# Patient Record
Sex: MALE | Race: WHITE | Employment: OTHER | URBAN - METROPOLITAN AREA
[De-identification: names, ages, dates, MRNs, and addresses within clinical notes are randomized per-mention and may not be internally consistent; named-entity substitution may affect disease eponyms.]

---

## 2022-12-14 ENCOUNTER — HOSPITAL ENCOUNTER (EMERGENCY)
Dept: ULTRASOUND IMAGING | Age: 37
Discharge: HOME | End: 2022-12-17

## 2022-12-14 ENCOUNTER — HOSPITAL ENCOUNTER (EMERGENCY)
Age: 37
Discharge: HOME | End: 2022-12-14
Attending: EMERGENCY MEDICINE

## 2022-12-14 VITALS
BODY MASS INDEX: 23.86 KG/M2 | OXYGEN SATURATION: 99 % | DIASTOLIC BLOOD PRESSURE: 74 MMHG | WEIGHT: 180 LBS | TEMPERATURE: 98.3 F | SYSTOLIC BLOOD PRESSURE: 124 MMHG | HEART RATE: 91 BPM | RESPIRATION RATE: 19 BRPM | HEIGHT: 73 IN

## 2022-12-14 DIAGNOSIS — I82.451 ACUTE DEEP VEIN THROMBOSIS (DVT) OF RIGHT PERONEAL VEIN (HCC): Primary | ICD-10-CM

## 2022-12-14 DIAGNOSIS — M79.604 RIGHT LEG PAIN: ICD-10-CM

## 2022-12-14 PROCEDURE — 99284 EMERGENCY DEPT VISIT MOD MDM: CPT

## 2022-12-14 PROCEDURE — 93971 EXTREMITY STUDY: CPT

## 2022-12-14 RX ORDER — RIVAROXABAN 15 MG-20MG
KIT ORAL
Qty: 42 EACH | Refills: 0 | Status: SHIPPED | OUTPATIENT
Start: 2022-12-14

## 2022-12-14 RX ORDER — INSULIN LISPRO 100 [IU]/ML
INJECTION, SOLUTION INTRAVENOUS; SUBCUTANEOUS
COMMUNITY

## 2022-12-14 NOTE — ED TRIAGE NOTES
54-year-old male presenting to the emergency department with chief complaint of right lower leg pain. He states to have hurt his leg playing ping-pong 1 week ago. Denies falls or suspicious mechanism for injury. States he has had an increase in pain and swelling in his right lower leg since that time. Denies history of VTE or anticoagulation therapy. Denies chest pain shortness of breath. On examination the patients right lower extremity is markedly larger in size than the left lower extremity. There is calf tenderness to palpation in the right calf. Right calf is notably firm compared to the left calf. VSS exception to tachycardia 101 BPM    Patient evaluated initially in triage. Rapid Medical Evaluation was conducted and necessary orders have been placed. I have performed a medical screening exam.  Care will now be transferred to the provider in the back of the emergency department.   SAHARA Brothers 6:05 PM

## 2022-12-14 NOTE — ED PROVIDER NOTES
[facility] Emergency Department Provider Note                   PCP:                No primary care provider on file. Age: 44 y.o. Sex: male       ICD-10-CM    1. Acute deep vein thrombosis (DVT) of right peroneal vein (HCC)  I82.451       2. Right leg pain  M79.604           DISPOSITION Decision To Discharge 12/14/2022 07:07:29 PM         Orders Placed This Encounter   Procedures    Vascular duplex lower extremity venous right        Anat Cabrera is a 44 y.o. male who presents to the Emergency Department with chief complaint of    Chief Complaint   Patient presents with    Leg Pain      69-year-old male presenting to the emergency department with chief complaint of right lower leg pain. He states to have hurt his leg playing ping-pong 1 week ago. Denies falls or suspicious mechanism for injury. States he has had an increase in pain and swelling in his right lower leg for approximately 1 week. Denies history of VTE or anticoagulation therapy. Denies chest pain shortness of breath. The history is provided by the patient. Review of Systems    Past Medical History:   Diagnosis Date    Diabetes mellitus ([facility] [address]         History reviewed. No pertinent surgical history. History reviewed. No pertinent family history. Social History     Socioeconomic History    Marital status: Single     Spouse name: None    Number of children: None    Years of education: None    Highest education level: None         Penicillins     Discharge Medication List as of 12/14/2022  7:28 PM        CONTINUE these medications which have NOT CHANGED    Details   insulin regular (HUMULIN R;NOVOLIN R) 100 UNIT/ML injection Inject into the skinHistorical Med      insulin lispro (HUMALOG) 100 UNIT/ML SOLN injection vial Humalog U-100 Insulin 100 unit/mL subcutaneous solutionHistorical Med              Vitals signs and nursing note reviewed.    Patient Vitals for the past 4 hrs:   Temp Pulse Resp BP SpO2   12/14/22 1930 -- 91 19 124/74 99 %   12/14/22 1758 98.3 °F (36.8 °C) (!) 101 18 127/82 100 %          Physical Exam  Vitals reviewed. Constitutional:       Appearance: Normal appearance. HENT:      Head: Normocephalic and atraumatic. Nose: No congestion or rhinorrhea. Mouth/Throat:      Mouth: Mucous membranes are moist.   Eyes:      Extraocular Movements: Extraocular movements intact. Conjunctiva/sclera: Conjunctivae normal.      Pupils: Pupils are equal, round, and reactive to light. Cardiovascular:      Rate and Rhythm: Normal rate and regular rhythm. Pulses: Normal pulses. Heart sounds: Normal heart sounds. No murmur heard. Pulmonary:      Effort: Pulmonary effort is normal. No respiratory distress. Breath sounds: Normal breath sounds. Abdominal:      General: Abdomen is flat. There is no distension. Palpations: Abdomen is soft. Tenderness: There is no abdominal tenderness. There is no guarding. Genitourinary:     Penis: Normal.       Testes: Normal.   Musculoskeletal:         General: Swelling and tenderness present. Normal range of motion. Cervical back: Normal range of motion and neck supple. No rigidity. Comments: Patients right lower extremity is markedly larger in size than the left lower extremity. There is calf tenderness to palpation in the right calf. Right calf is notably firmer compared to the left calf. Positive Homans' sign. Skin:     General: Skin is warm and dry. Neurological:      General: No focal deficit present. Mental Status: He is alert and oriented to person, place, and time. Psychiatric:         Mood and Affect: Mood normal.         Behavior: Behavior normal.         Thought Content:  Thought content normal.         Judgment: Judgment normal.        MDM  Number of Diagnoses or Management Options  Acute deep vein thrombosis (DVT) of right peroneal vein (HCC)  Right leg pain  Diagnosis management comments: 27-year-old male presenting to the emergency department with chief complaint of right lower extremity pain and swelling. DVT, musculoskeletal pain    The presentation and symptoms of this patient is concerning for possible DVT due to worsening right lower extremity pain and swelling with no preceding trauma. There is no erythema on examination of the skin; I have a low clinical suspicion that this is of a cellulitic etiology. We will obtain Doppler ultrasound of right lower extremity to rule out DVT. Right lower extremity Doppler ultrasound reveals occlusive thrombus in the right peroneal vein. Decision to begin anticoagulation treatment with Xarelto starter pack. Discussed with the patient the importance of adherence to medication regimen and to follow-up/establish with a primary care provider for continuity of care. Discussed return precautions of leg pain, shortness of breath, and/or chest pain. Patient expressed understanding. Amount and/or Complexity of Data Reviewed  Tests in the radiology section of CPT®: ordered and reviewed    Patient Progress  Patient progress: stable      Procedures      Labs Reviewed - No data to display     Vascular duplex lower extremity venous right   Final Result                             Voice dictation software was used during the making of this note. This software is not perfect and grammatical and other typographical errors may be present. This note has not been completely proofread for errors.      SAHARA Khan  12/14/22 2023

## 2022-12-14 NOTE — ED TRIAGE NOTES
Pt states that he injured his right leg about a week ago. Pt states that since then, he's had increased pain and swelling in his lower right leg since then.

## 2022-12-15 NOTE — ED NOTES
I have reviewed discharge instructions with the patient. The patient verbalized understanding. Patient left ED via Discharge Method: ambulatory to Home with family. Opportunity for questions and clarification provided. Patient given 1 scripts. To continue your aftercare when you leave the hospital, you may receive an automated call from our care team to check in on how you are doing. This is a free service and part of our promise to provide the best care and service to meet your aftercare needs.  If you have questions, or wish to unsubscribe from this service please call [phone]. Thank you for Choosing our [facility] Emergency Department.         Juan C Yang RN  12/14/22 9217

## 2022-12-15 NOTE — DISCHARGE INSTRUCTIONS
Your work-up today in the emergency department indicates you have a blood clot in your right leg in the peroneal vein. You will start a new medication called Xarelto. Make sure you take this medication with food. This is a blood thinning medication to help prevent additional clots from forming. Information and phone number for establishing with a primary care provider listed below. Return to the emergency department if you experience any worsening symptoms of leg pain, shortness of breath, or chest pain. We would love to help you get a primary care doctor for follow-up after your emergency department visit. Please call [phone] between 7AM - 6PM Monday to Friday. A care navigator will be able to assist you with setting up a doctor close to your home.